# Patient Record
Sex: MALE | Race: WHITE | NOT HISPANIC OR LATINO | URBAN - METROPOLITAN AREA
[De-identification: names, ages, dates, MRNs, and addresses within clinical notes are randomized per-mention and may not be internally consistent; named-entity substitution may affect disease eponyms.]

---

## 2021-02-10 ENCOUNTER — EMERGENCY (EMERGENCY)
Facility: HOSPITAL | Age: 66
LOS: 1 days | Discharge: HOME | End: 2021-02-10
Attending: EMERGENCY MEDICINE
Payer: MEDICARE

## 2021-02-10 VITALS
HEART RATE: 66 BPM | OXYGEN SATURATION: 98 % | DIASTOLIC BLOOD PRESSURE: 78 MMHG | TEMPERATURE: 98 F | SYSTOLIC BLOOD PRESSURE: 115 MMHG | RESPIRATION RATE: 18 BRPM

## 2021-02-10 VITALS
TEMPERATURE: 98 F | WEIGHT: 192.9 LBS | DIASTOLIC BLOOD PRESSURE: 77 MMHG | HEART RATE: 75 BPM | SYSTOLIC BLOOD PRESSURE: 130 MMHG | HEIGHT: 72 IN | RESPIRATION RATE: 18 BRPM

## 2021-02-10 PROCEDURE — 99284 EMERGENCY DEPT VISIT MOD MDM: CPT

## 2021-02-10 PROCEDURE — 70450 CT HEAD/BRAIN W/O DYE: CPT

## 2021-02-10 PROCEDURE — G1004: CPT

## 2021-02-10 PROCEDURE — 72170 X-RAY EXAM OF PELVIS: CPT | Mod: 26

## 2021-02-10 PROCEDURE — 70450 CT HEAD/BRAIN W/O DYE: CPT | Mod: 26,MG

## 2021-02-10 PROCEDURE — 72170 X-RAY EXAM OF PELVIS: CPT

## 2021-02-10 RX ORDER — DIAZEPAM 5 MG
5 TABLET ORAL ONCE
Refills: 0 | Status: DISCONTINUED | OUTPATIENT
Start: 2021-02-10 | End: 2021-02-10

## 2021-02-10 RX ORDER — DIAZEPAM 5 MG
1 TABLET ORAL
Qty: 8 | Refills: 0
Start: 2021-02-10 | End: 2021-02-12

## 2021-02-10 RX ORDER — LIDOCAINE 4 G/100G
1 CREAM TOPICAL ONCE
Refills: 0 | Status: COMPLETED | OUTPATIENT
Start: 2021-02-10 | End: 2021-02-10

## 2021-02-10 RX ADMIN — Medication 5 MILLIGRAM(S): at 15:09

## 2021-02-10 RX ADMIN — LIDOCAINE 1 PATCH: 4 CREAM TOPICAL at 15:10

## 2021-02-10 NOTE — ED ADULT NURSE NOTE - OBJECTIVE STATEMENT
Patient presents to Ed with c/o fall. Patient reports falling down his back steps injuring his back lt elbow and hit his head.  Patient denies loc ambulatory after injury, denies chest pain reports feeling lightheaded, no numbness or tingling sensation  denies headache, patient sent from urgent care for head ct.

## 2021-02-10 NOTE — ED PROVIDER NOTE - PROGRESS NOTE DETAILS
Pt declined elbow Xray. Reports persistent pain/spasming of L lower back/buttock. Ambulatory in ED hallway without issue. Wife picking him up from ED, will give muscle relaxer and re-assess. - Sherita Haq PA-C Pt reports improvement of pain, remains ambulatory, eager to go home. WIll d/c with ortho/spine f/u. - Sherita Haq PA-C Pt reports improvement of pain, remains ambulatory, eager to go home. Cane provided. CT results d/w patient. Will d/c with ortho/spine f/u. - Sherita Haq PA-C Agree c above.  Remains neurologically intact with a minimally antalgic gait.  Prelim XR pelvis per radiology demonstrates no fracture.  Nontoxic appearing, pain well controlled.  Patient is comfortable c d/c plan.  --BMM

## 2021-02-10 NOTE — ED PROVIDER NOTE - CARE PLAN
Principal Discharge DX:	Lumbosacral pain  Secondary Diagnosis:	Fall, initial encounter  Secondary Diagnosis:	Head injury

## 2021-02-10 NOTE — ED PROVIDER NOTE - NSFOLLOWUPCLINICS_GEN_ALL_ED_FT
[facility]  Orthopedic Surgery  [address], 3rd & 4th [place] [address]  Phone: [phone]  Fax:   Follow Up Time: 1-3 Days    [facility]  Orthopedic Surgery  [address] [place] [address]  Phone: [phone]  Fax:   Follow Up Time: 1-3 Days    [facility]  Orthopedic Surgery  [address] [place] [address]  Phone: [phone]  Fax:   Follow Up Time: 1-3 Days

## 2021-02-10 NOTE — ED PROVIDER NOTE - CLINICAL SUMMARY MEDICAL DECISION MAKING FREE TEXT BOX
Mechanical L buttock pain and reported head strike without obvious head trauma or HA.  Neurologically intact aside from antalgic gait that seems to be 2/2 L gluteal pain.  Exam seems c/w L soft tissue contusion without e/o ecchymosis/swelling.  Compartments are soft.  L hip is nontender and hip has full range of motion without pain.  On plavix and ASA 81.  Primary survey is unremarkable.  Also c L elbow abrasion without ecchymosis, swelling but essentially nontender and has full painless range of motion.  XR, CT head, reassess.  Hopeful d/c.  --BMM

## 2021-02-10 NOTE — ED PROVIDER NOTE - OBJECTIVE STATEMENT
64yo M with PMH HTN, HLD, CAD with stents on ASA/plavix, s/p fall this morning with head injury. Pt reports he slipped on ice on his back steps, fell onto lower back, L elbow, and + head strike. No LOC. Able to ambulate shortly after fall. Pt reports he went to  for his L lower back pain and advised to go to ER for CTH. No imaging at . Reports he took 3 of his wife's arthritis strength tylenol prior to coming. Reports he has h/o vertigo x 30 years and feels "foggy" but no change from baseline vertigo. Wife drove him to ER and pt also reports vertigo sx usually occurs after being passenger in car. Denies any HA, vision changes, n/v, numbness/tingling, weakness.

## 2021-02-10 NOTE — ED PROVIDER NOTE - NSFOLLOWUPINSTRUCTIONS_ED_ALL_ED_FT
Rest. No heavy lifting. Warm compresses to area. Light walking.      Take Tylenol 650mg every 6 hours for pain as needed.     May keep lidoderm patch on for up to 12 hours; do not use more than 1 in 24 hour period. OTC patches: Salonpas    May take Valium every 8 hours as needed for muscle spasm *** caution SIDE EFFECT of drowsiness - DO NOT drive or drink alcohol while taking this medication.     Follow up with your Primary Care Provider within 48-72 hours.    Please also follow up with orthopedic/ Spine Center if pain persists.   Follow up with the [facility] by calling [phone].     Return to ER for any worsening pain, weakness, numbness, bowel or urinary incontinence or retention, new/worsening headaches, vision changes, vomiting, confusion, or any other concerning symptoms.     -------------------

## 2021-02-10 NOTE — ED PROVIDER NOTE - LOCATION
+ abrasions and swelling to L elbow with full ROM intact; LUE otherwise WNL; extremity exam otherwise normal

## 2021-02-10 NOTE — ED PROVIDER NOTE - PATIENT PORTAL LINK FT
You can access the FollowMyHealth Patient Portal offered by [facility] by registering at the following website: [email or web address]. By joining [facility]’s FollowMyHealth portal, you will also be able to view your health information using other applications (apps) compatible with our system.

## 2021-10-19 NOTE — ED ADULT TRIAGE NOTE - ESI TRIAGE ACUITY LEVEL, MLM
Add 52 Modifier (Optional): no Medical Necessity Clause: This procedure was medically necessary because the lesions that were treated were: Show Aperture Variable?: Yes Medical Necessity Information: It is in your best interest to select a reason for this procedure from the list below. All of these items fulfill various CMS LCD requirements except the new and changing color options. Detail Level: Detailed Post-Care Instructions: I reviewed with the patient in detail post-care instructions. Patient is to wear sunprotection, and avoid picking at any of the treated lesions. Pt may apply Vaseline to crusted or scabbing areas. Consent: The patient's consent was obtained including but not limited to risks of crusting, scabbing, blistering, scarring, darker or lighter pigmentary change, recurrence, incomplete removal and infection. 3

## 2022-07-03 ENCOUNTER — EMERGENCY (EMERGENCY)
Facility: HOSPITAL | Age: 67
LOS: 1 days | Discharge: LEFT BEFORE DISCHARGE | End: 2022-07-03
Payer: MEDICARE

## 2022-07-03 VITALS
HEART RATE: 72 BPM | DIASTOLIC BLOOD PRESSURE: 87 MMHG | SYSTOLIC BLOOD PRESSURE: 144 MMHG | HEIGHT: 72 IN | WEIGHT: 192.9 LBS | OXYGEN SATURATION: 100 % | TEMPERATURE: 98 F | RESPIRATION RATE: 18 BRPM

## 2022-07-03 PROCEDURE — 93005 ELECTROCARDIOGRAM TRACING: CPT

## 2022-07-03 PROCEDURE — L9991: CPT

## 2022-07-03 NOTE — ED CLERICAL - NS ED CLERK NOTE PRE-ARRIVAL INFORMATION; ADDITIONAL PRE-ARRIVAL INFORMATION
CC/Reason For referral:  atypical chest pain, elevated pulse, shoulder pain radiating down arm, dizzy.  patient given 4 baby aspirin prior to leaving [facility]  Preferred Consultant(if applicable):  Who admits for you (if needed):  Do you have documents you would like to fax over?  Would you still like to speak to an ED attending?  please call after patient is seen

## 2022-07-03 NOTE — ED ADULT TRIAGE NOTE - EXPLANATION OF PATIENT'S REASON FOR LEAVING
States he did not want to wait any longer. Pt ambulatory out of triage. Explained return precautions.

## 2023-05-16 NOTE — ED ADULT TRIAGE NOTE - HEIGHT IN CM
182.88 Cheek Interpolation Flap Text: A decision was made to reconstruct the defect utilizing an interpolation axial flap and a staged reconstruction.  A telfa template was made of the defect.  This telfa template was then used to outline the Cheek Interpolation flap.  The donor area for the pedicle flap was then injected with anesthesia.  The flap was excised through the skin and subcutaneous tissue down to the layer of the underlying musculature.  The interpolation flap was carefully excised within this deep plane to maintain its blood supply.  The edges of the donor site were undermined.   The donor site was closed in a primary fashion.  The pedicle was then rotated into position and sutured.  Once the tube was sutured into place, adequate blood supply was confirmed with blanching and refill.  The pedicle was then wrapped with xeroform gauze and dressed appropriately with a telfa and gauze bandage to ensure continued blood supply and protect the attached pedicle.

## 2023-06-23 ENCOUNTER — OFFICE (OUTPATIENT)
Dept: URBAN - METROPOLITAN AREA CLINIC 90 | Facility: CLINIC | Age: 68
Setting detail: OPHTHALMOLOGY
End: 2023-06-23
Payer: MEDICARE

## 2023-06-23 DIAGNOSIS — H25.13: ICD-10-CM

## 2023-06-23 DIAGNOSIS — H40.013: ICD-10-CM

## 2023-06-23 DIAGNOSIS — D31.31: ICD-10-CM

## 2023-06-23 PROCEDURE — 92250 FUNDUS PHOTOGRAPHY W/I&R: CPT | Performed by: OPHTHALMOLOGY

## 2023-06-23 PROCEDURE — 76514 ECHO EXAM OF EYE THICKNESS: CPT | Performed by: OPHTHALMOLOGY

## 2023-06-23 PROCEDURE — 92014 COMPRE OPH EXAM EST PT 1/>: CPT | Performed by: OPHTHALMOLOGY

## 2023-06-23 PROCEDURE — 92083 EXTENDED VISUAL FIELD XM: CPT | Performed by: OPHTHALMOLOGY

## 2023-06-23 ASSESSMENT — KERATOMETRY
OS_AXISANGLE_DEGREES: 123
OD_AXISANGLE_DEGREES: 062
METHOD_AUTO_MANUAL: AUTO
OD_K1POWER_DIOPTERS: 43.25
OS_K1POWER_DIOPTERS: 43.00
OD_K2POWER_DIOPTERS: 45.00
OS_K2POWER_DIOPTERS: 44.75

## 2023-06-23 ASSESSMENT — REFRACTION_MANIFEST
OS_SPHERE: -1.00
OS_VA2: 20/20
OS_SPHERE: -0.50
OD_ADD: +2.50
OS_ADD: +2.00
OD_VA1: 20/25-1
OD_CYLINDER: -1.75
OD_VA1: 20/25
OD_AXIS: 130
OS_ADD: +2.25
OD_AXIS: 150
OS_AXIS: 040
OS_SPHERE: -0.50
OD_CYLINDER: -1.75
OS_VA1: 20/30-1
OD_SPHERE: -1.50
OD_CYLINDER: -1.75
OD_SPHERE: -1.00
OD_AXIS: 130
OS_ADD: +2.25
OD_SPHERE: -1.00
OS_VA1: 20/30
OS_AXIS: 040
OD_CYLINDER: -1.75
OS_CYLINDER: -2.75
OS_AXIS: 045
OD_VA2: 20/20
OS_CYLINDER: -1.25
OS_AXIS: 050
OD_VA2: 20/20
OS_ADD: +2.25
OD_AXIS: 135
OD_ADD: +2.25
OD_CYLINDER: -2.00
OS_VA2: 20/20
OS_VA2: 20/20(J1+)
OS_CYLINDER: -2.75
OD_ADD: +2.25
OD_AXIS: 130
OS_AXIS: 045
OD_CYLINDER: -1.75
OS_VA2: 20/20
OS_VA1: 20/25+1
OS_VA1: 20/30+-
OD_ADD: +2.25
OS_ADD: +2.50
OS_CYLINDER: -2.50
OD_ADD: +2.00
OS_SPHERE: -1.00
OD_SPHERE: -1.00
OD_VA1: 20/30+2
OD_AXIS: 130
OD_VA1: 20/30+-
OS_AXIS: 040
OS_SPHERE: -0.50
OD_VA1: 20/25+
OS_CYLINDER: -2.25
OD_VA2: 20/20(J1+)
OS_SPHERE: -0.50
OD_SPHERE: -1.00
OD_ADD: +2.50
OD_VA2: 20/20
OD_SPHERE: -0.75
OD_VA2: 20/20
OS_ADD: +2.50
OU_VA: 20/25-1
OS_VA1: 20/25+
OS_VA2: 20/20
OS_CYLINDER: -1.75

## 2023-06-23 ASSESSMENT — REFRACTION_CURRENTRX
OS_VPRISM_DIRECTION: PROGS
OD_CYLINDER: -1.75
OD_OVR_VA: 20/
OD_ADD: +2.75
OS_CYLINDER: -1.00
OS_AXIS: 014
OS_ADD: +2.75
OS_CYLINDER: -1.75
OD_SPHERE: -1.00
OS_AXIS: 043
OD_VPRISM_DIRECTION: PROGS
OS_VPRISM_DIRECTION: PROGS
OS_SPHERE: -2.00
OD_AXIS: 155
OS_SPHERE: -1.00
OS_OVR_VA: 20/
OD_ADD: +2.50
OD_SPHERE: -2.00
OD_CYLINDER: -1.00
OD_AXIS: 129
OD_VPRISM_DIRECTION: PROGS
OS_ADD: +2.50
OD_OVR_VA: 20/
OS_OVR_VA: 20/

## 2023-06-23 ASSESSMENT — AXIALLENGTH_DERIVED
OS_AL: 24.0979
OD_AL: 23.8025
OS_AL: 24.0979
OD_AL: 24.3088
OD_AL: 24.1037
OS_AL: 23.7968
OS_AL: 24.1999
OD_AL: 24.1547
OS_AL: 24.1488
OS_AL: 24.1999
OD_AL: 24.1037
OD_AL: 24.1037
OS_AL: 24.1999
OD_AL: 24.0025

## 2023-06-23 ASSESSMENT — SPHEQUIV_DERIVED
OD_SPHEQUIV: -1.875
OD_SPHEQUIV: -2.375
OS_SPHEQUIV: -1.875
OD_SPHEQUIV: -1.625
OD_SPHEQUIV: -2
OS_SPHEQUIV: -1.625
OS_SPHEQUIV: -1.875
OS_SPHEQUIV: -1.75
OD_SPHEQUIV: -1.875
OD_SPHEQUIV: -1.875
OS_SPHEQUIV: -1.625
OS_SPHEQUIV: -0.875
OS_SPHEQUIV: -1.875
OD_SPHEQUIV: -1.125

## 2023-06-23 ASSESSMENT — TONOMETRY
OS_IOP_MMHG: 10
OD_IOP_MMHG: 10

## 2023-06-23 ASSESSMENT — REFRACTION_AUTOREFRACTION
OS_AXIS: 045
OS_CYLINDER: -2.25
OD_CYLINDER: -1.75
OD_SPHERE: -0.25
OD_AXIS: 141
OS_SPHERE: +0.25

## 2023-06-23 ASSESSMENT — PACHYMETRY
OS_CT_CORRECTION: 1
OD_CT_UM: 554
OS_CT_UM: 538
OD_CT_CORRECTION: -1

## 2023-06-23 ASSESSMENT — VISUAL ACUITY
OS_BCVA: 20/25-2
OD_BCVA: 20/25-2

## 2023-06-23 ASSESSMENT — CONFRONTATIONAL VISUAL FIELD TEST (CVF)
OS_FINDINGS: FULL
OD_FINDINGS: FULL

## 2024-02-14 ENCOUNTER — RX ONLY (RX ONLY)
Age: 69
End: 2024-02-14

## 2024-02-14 ENCOUNTER — OFFICE (OUTPATIENT)
Dept: URBAN - METROPOLITAN AREA CLINIC 90 | Facility: CLINIC | Age: 69
Setting detail: OPHTHALMOLOGY
End: 2024-02-14
Payer: MEDICARE

## 2024-02-14 DIAGNOSIS — H16.223: ICD-10-CM

## 2024-02-14 DIAGNOSIS — B30.9: ICD-10-CM

## 2024-02-14 PROCEDURE — 92012 INTRM OPH EXAM EST PATIENT: CPT | Performed by: OPHTHALMOLOGY

## 2024-02-14 ASSESSMENT — SUPERFICIAL PUNCTATE KERATITIS (SPK)
OD_SPK: T
OS_SPK: T 1+

## 2024-02-14 ASSESSMENT — SPHEQUIV_DERIVED
OS_SPHEQUIV: -1.875
OD_SPHEQUIV: -1.125
OD_SPHEQUIV: -2.375
OD_SPHEQUIV: -1.875
OS_SPHEQUIV: -1.625
OS_SPHEQUIV: -1.875
OD_SPHEQUIV: -1.875
OD_SPHEQUIV: -1.875
OS_SPHEQUIV: -1.875
OS_SPHEQUIV: -1.625
OS_SPHEQUIV: -1.75
OD_SPHEQUIV: -1.625
OS_SPHEQUIV: -0.875
OD_SPHEQUIV: -2

## 2024-02-14 ASSESSMENT — REFRACTION_MANIFEST
OS_VA1: 20/25+1
OD_CYLINDER: -2.00
OD_SPHERE: -1.00
OS_VA2: 20/20
OD_AXIS: 130
OD_VA2: 20/20
OS_SPHERE: -1.00
OS_CYLINDER: -2.50
OD_VA2: 20/20(J1+)
OS_VA1: 20/30+-
OS_AXIS: 050
OS_SPHERE: -0.50
OS_SPHERE: -0.50
OS_VA2: 20/20(J1+)
OS_SPHERE: -0.50
OS_SPHERE: -0.50
OS_CYLINDER: -2.25
OD_VA1: 20/30+-
OS_CYLINDER: -2.75
OS_VA2: 20/20
OD_CYLINDER: -1.75
OS_ADD: +2.50
OD_SPHERE: -0.75
OS_ADD: +2.25
OD_ADD: +2.00
OD_VA1: 20/25-1
OS_ADD: +2.00
OS_ADD: +2.50
OD_ADD: +2.50
OD_SPHERE: -1.50
OU_VA: 20/25-1
OD_ADD: +2.25
OD_VA2: 20/20
OD_SPHERE: -1.00
OD_AXIS: 150
OD_AXIS: 135
OS_VA1: 20/30
OS_VA1: 20/30-1
OD_ADD: +2.25
OS_AXIS: 040
OS_CYLINDER: -1.75
OS_VA2: 20/20
OS_AXIS: 040
OD_ADD: +2.25
OD_CYLINDER: -1.75
OD_SPHERE: -1.00
OD_AXIS: 130
OS_AXIS: 045
OD_CYLINDER: -1.75
OS_AXIS: 045
OS_ADD: +2.25
OS_CYLINDER: -1.25
OD_AXIS: 130
OD_CYLINDER: -1.75
OS_SPHERE: -1.00
OD_SPHERE: -1.00
OD_CYLINDER: -1.75
OS_AXIS: 040
OS_CYLINDER: -2.75
OD_ADD: +2.50
OS_VA1: 20/25+
OD_VA2: 20/20
OD_VA1: 20/25
OD_VA2: 20/20
OD_VA1: 20/30+2
OD_AXIS: 130
OD_VA1: 20/25+
OS_VA2: 20/20
OS_ADD: +2.25

## 2024-02-14 ASSESSMENT — REFRACTION_AUTOREFRACTION
OS_SPHERE: +0.25
OS_CYLINDER: -2.25
OD_SPHERE: -0.25
OS_AXIS: 045
OD_CYLINDER: -1.75
OD_AXIS: 141

## 2024-02-14 ASSESSMENT — REFRACTION_CURRENTRX
OS_VPRISM_DIRECTION: PROGS
OS_VPRISM_DIRECTION: PROGS
OS_ADD: +2.50
OD_ADD: +2.75
OS_OVR_VA: 20/
OD_ADD: +2.50
OD_CYLINDER: -1.00
OS_ADD: +2.75
OD_AXIS: 155
OD_SPHERE: -1.00
OD_AXIS: 129
OD_OVR_VA: 20/
OS_SPHERE: -1.00
OD_SPHERE: -2.00
OS_OVR_VA: 20/
OS_SPHERE: -2.00
OD_CYLINDER: -1.75
OS_AXIS: 014
OD_VPRISM_DIRECTION: PROGS
OD_OVR_VA: 20/
OS_CYLINDER: -1.75
OS_AXIS: 043
OS_CYLINDER: -1.00
OD_VPRISM_DIRECTION: PROGS

## 2024-02-14 ASSESSMENT — CONFRONTATIONAL VISUAL FIELD TEST (CVF)
OS_FINDINGS: FULL
OD_FINDINGS: FULL

## 2024-06-07 NOTE — ED ADULT TRIAGE NOTE - WEIGHT IN KG
Learning About Lung Cancer Screening  What is screening for lung cancer?     Lung cancer screening is a way to find some lung cancers early, before a person has any symptoms of the cancer.  Lung cancer screening may help those who have the highest risk for lung cancer--people age 50 and older who are or were heavy smokers. For most people, who aren't at increased risk, screening for lung cancer probably isn't helpful.  Screening won't prevent cancer. And it may not find all lung cancers. Lung cancer screening may lower the risk of dying from lung cancer in a small number of people.  How is it done?  Lung cancer screening is done with a low-dose CT (computed tomography) scan. A CT scan uses X-rays, or radiation, to make detailed pictures of your body. Experts recommend that screening be done in medical centers that focus on finding and treating lung cancer.  Who is screening recommended for?  Lung cancer screening is recommended for people age 50 and older who are or were heavy smokers. That means people with a smoking history of at least 20 pack years. A pack year is a way to measure how heavy a smoker you are or were.  To figure out your pack years, multiply how many packs a day on average (assuming 20 cigarettes per pack) you have smoked by how many years you have smoked. For example:  If you smoked 1 pack a day for 20 years, that's 1 times 20. So you have a smoking history of 20 pack years.  If you smoked 2 packs a day for 10 years, that's 2 times 10. So you have a smoking history of 20 pack years.  Experts agree that screening is for people who have a high risk of lung cancer. But experts don't agree on what high risk means. Some say people age 50 or older with at least a 20-pack-year smoking history are high risk. Others say it's people age 55 or older with a 30-pack-year history.  To see if you could benefit from screening, first find out if you are at high risk for lung cancer. Your doctor can help you  87.5